# Patient Record
(demographics unavailable — no encounter records)

---

## 2025-04-11 NOTE — HISTORY OF PRESENT ILLNESS
[FreeTextEntry1] : 56yo female incidentally found to have right renal mass from CT in 2/2021, s/p RAL R partial nephrectomy July 2021 Path pT1a G2 clear cell RCC. Patient presents to review surveillance CT. States she has been feeling well. No complaints Denies hematuria. dysuria, flank pain. Does endorse occasional "tugging" sensation over right flank area with certain positional changes that are not painful or bothersome, but that she notices. CT 3/23 - no evidence of recurrence or mets CT 3/24 -  no evidence of recurrence or mets CT 3/2025 - no evidence of recurrence or mets, small 5mm hypodense lesion in R upper pole too small to characterize

## 2025-04-11 NOTE — ASSESSMENT
[FreeTextEntry1] : 59yo female incidentally found to have right renal mass from CT in 2/2021, s/p RAL R partial nephrectomy July 2021 Path pT1a G2 clear cell RCC CT no recurrence in 2023, 2024, and most recently 3/2025  Plan: - Obtain CXR this year per guideline recommendations - Follow up 1 year for surveillance CT and CXR - CT and CXR ordered

## 2025-04-11 NOTE — END OF VISIT
[] : Resident [FreeTextEntry3] : CXR yearly until year 5  Chest ct done with abd ct :neg recurrence CXR OK with f/u abd ct as LOW RISK partial  Chest CT for HIGH Risk or Very high risk as per guidelines

## 2025-04-21 NOTE — HISTORY OF PRESENT ILLNESS
[FreeTextEntry8] : Patient is a 59yo F with PMHx of RCC s/p partial nephrectomy in 2021, HTN, obesity, asthma, uterine fibromas, prediabetes, hx of GIB, presenting to the office for b/l hand numbness. Occurring over a few years, worse after the pandemic. Has been using her carpal tunnel braces. Worse in the morning. Has not taken any medications for this. Associated neck stiffness. No injury/trauma.   Patient also concerned with shortness of breath with exertion like taking garbage can to the curb or climbing stairs, has been worsening over the past couple of years. States her legs get swollen in the night. Denies PND, orthopnea, chest pain. No hx of asthma/COPD, no smoking but states she was given an inhaler years ago that she doesnt use. States she feels like she hears whistling inside her chest occasionally.  Also concerned with palpitations, had one incidence a month ago, took 1 minute to resolve. None since then.

## 2025-04-21 NOTE — PHYSICAL EXAM
[No Acute Distress] : no acute distress [Well Nourished] : well nourished [No Respiratory Distress] : no respiratory distress  [No Accessory Muscle Use] : no accessory muscle use [Clear to Auscultation] : lungs were clear to auscultation bilaterally [Normal Rate] : normal rate  [Regular Rhythm] : with a regular rhythm [Normal S1, S2] : normal S1 and S2 [No Murmur] : no murmur heard [Pedal Pulses Present] : the pedal pulses are present [Normal Affect] : the affect was normal [Normal Insight/Judgement] : insight and judgment were intact [de-identified] : +1 b/l LE nonpitting edema [de-identified] : spurling neg b/l

## 2025-04-21 NOTE — HISTORY OF PRESENT ILLNESS
[FreeTextEntry8] : Patient is a 61yo F with PMHx of RCC s/p partial nephrectomy in 2021, HTN, obesity, asthma, uterine fibromas, prediabetes, hx of GIB, presenting to the office for b/l hand numbness. Occurring over a few years, worse after the pandemic. Has been using her carpal tunnel braces. Worse in the morning. Has not taken any medications for this. Associated neck stiffness. No injury/trauma.   Patient also concerned with shortness of breath with exertion like taking garbage can to the curb or climbing stairs, has been worsening over the past couple of years. States her legs get swollen in the night. Denies PND, orthopnea, chest pain. No hx of asthma/COPD, no smoking but states she was given an inhaler years ago that she doesnt use. States she feels like she hears whistling inside her chest occasionally.  Also concerned with palpitations, had one incidence a month ago, took 1 minute to resolve. None since then.

## 2025-04-21 NOTE — REVIEW OF SYSTEMS
[Palpitations] : palpitations [Lower Ext Edema] : lower extremity edema [Dyspnea on Exertion] : dyspnea on exertion [Fever] : no fever [Chills] : no chills [Fatigue] : no fatigue [Hot Flashes] : no hot flashes [Chest Pain] : no chest pain [Leg Claudication] : no leg claudication [Orthopnea] : no orthopnea [Paroxysmal Nocturnal Dyspnea] : no paroxysmal nocturnal dyspnea [Shortness Of Breath] : no shortness of breath [Wheezing] : no wheezing [Cough] : no cough [Abdominal Pain] : no abdominal pain

## 2025-04-21 NOTE — PHYSICAL EXAM
[No Acute Distress] : no acute distress [Well Nourished] : well nourished [No Respiratory Distress] : no respiratory distress  [No Accessory Muscle Use] : no accessory muscle use [Clear to Auscultation] : lungs were clear to auscultation bilaterally [Normal Rate] : normal rate  [Regular Rhythm] : with a regular rhythm [Normal S1, S2] : normal S1 and S2 [No Murmur] : no murmur heard [Pedal Pulses Present] : the pedal pulses are present [Normal Affect] : the affect was normal [Normal Insight/Judgement] : insight and judgment were intact [de-identified] : +1 b/l LE nonpitting edema [de-identified] : spurling neg b/l

## 2025-05-11 NOTE — PHYSICAL EXAM
[No Acute Distress] : no acute distress [Well Nourished] : well nourished [Well Developed] : well developed [Well-Appearing] : well-appearing [No Respiratory Distress] : no respiratory distress  [No Accessory Muscle Use] : no accessory muscle use [Clear to Auscultation] : lungs were clear to auscultation bilaterally [Normal Rate] : normal rate  [Regular Rhythm] : with a regular rhythm [Normal S1, S2] : normal S1 and S2 [Soft] : abdomen soft [Non Tender] : non-tender [Non-distended] : non-distended [Normal Bowel Sounds] : normal bowel sounds [Normal Affect] : the affect was normal [Normal Insight/Judgement] : insight and judgment were intact [Normal Outer Ear/Nose] : the outer ears and nose were normal in appearance [Normal Oropharynx] : the oropharynx was normal [de-identified] : no sinus tenderness to palpation [de-identified] : dry cough

## 2025-05-11 NOTE — PHYSICAL EXAM
[No Acute Distress] : no acute distress [Well Nourished] : well nourished [Well Developed] : well developed [Well-Appearing] : well-appearing [No Respiratory Distress] : no respiratory distress  [No Accessory Muscle Use] : no accessory muscle use [Clear to Auscultation] : lungs were clear to auscultation bilaterally [Normal Rate] : normal rate  [Regular Rhythm] : with a regular rhythm [Normal S1, S2] : normal S1 and S2 [Soft] : abdomen soft [Non Tender] : non-tender [Non-distended] : non-distended [Normal Bowel Sounds] : normal bowel sounds [Normal Affect] : the affect was normal [Normal Insight/Judgement] : insight and judgment were intact [Normal Outer Ear/Nose] : the outer ears and nose were normal in appearance [Normal Oropharynx] : the oropharynx was normal [de-identified] : no sinus tenderness to palpation [de-identified] : dry cough

## 2025-05-11 NOTE — HISTORY OF PRESENT ILLNESS
[FreeTextEntry8] : Patient is a 61yo F with PMHx of RCC s/p partial nephrectomy in 2021, HTN, obesity, asthma, uterine fibromas, prediabetes, hx of GIB, presenting to the office for concern for sinus infection. Patient states that symptoms started about two weeks ago with fever, sneezing and headache. After about 5 days, she found 6 tablets of amoxicillin in her cabinet and took them. She also took a home Covid test which was negative. She presents today with concern that symptoms still remaining. Feels that symptoms have gotten better but still has cough, sore throat and sinus pressure. She also feels that she has congestion and heaviness in her throat. She has been taking Mucinex as well with minimal relief. She is travelling next week and concerned about feeling better.

## 2025-05-11 NOTE — HISTORY OF PRESENT ILLNESS
[FreeTextEntry8] : Patient is a 59yo F with PMHx of RCC s/p partial nephrectomy in 2021, HTN, obesity, asthma, uterine fibromas, prediabetes, hx of GIB, presenting to the office for concern for sinus infection. Patient states that symptoms started about two weeks ago with fever, sneezing and headache. After about 5 days, she found 6 tablets of amoxicillin in her cabinet and took them. She also took a home Covid test which was negative. She presents today with concern that symptoms still remaining. Feels that symptoms have gotten better but still has cough, sore throat and sinus pressure. She also feels that she has congestion and heaviness in her throat. She has been taking Mucinex as well with minimal relief. She is travelling next week and concerned about feeling better.

## 2025-05-30 NOTE — HISTORY OF PRESENT ILLNESS
[FreeTextEntry8] : Patient is a 59yo F presenting for CSP, due for mammo. [FreeTextEntry1] : SOB/hand numbness f/u  [de-identified] : Patient is a 59-year-old with PMHx of RCC s/p partial nephrectomy in 2021, HTN, obesity, asthma, uterine fibromas, prediabetes, presenting to the office for f/u on SOB and b/l hand numbness.   Patient last seen for this 4/18/25. Completed TTE, BNP WNL. B12/folate/vit D/TSH WNL. Cervical XR w/ degenerative changes, straightening of cervical lordosis.

## 2025-05-30 NOTE — PHYSICAL EXAM
[Examination Of The Breasts] : a normal appearance [Breast Implant Bilateral] : implants [Normal] : normal [No Discharge] : no discharge [No Masses] : no breast masses were palpable [No Acute Distress] : no acute distress [Well Nourished] : well nourished [No Respiratory Distress] : no respiratory distress  [No Accessory Muscle Use] : no accessory muscle use [Clear to Auscultation] : lungs were clear to auscultation bilaterally [Normal Rate] : normal rate  [Regular Rhythm] : with a regular rhythm [Normal S1, S2] : normal S1 and S2 [Normal Affect] : the affect was normal [Normal Insight/Judgement] : insight and judgment were intact [Breast Palpation Diffuse Fibrous Tissue Bilateral] : no fibrocystic changes [Breast Atrophy Bilateral] : no atrophy [Breast Hypertrophy Bilateral] : no hypertrophy [Breast - [place] Bilateral] : no [place] [Dimpling In Both Breasts] : no dimpling [Superficial Breast Veins Dilated Bilaterally] : no dilated superficial veins [Breast Nipple Inversion] : not inverted [Breast Nipple Retraction] : not retracted [Breast Nipple Flattening] : not flattened [Breast Nipple Fissures] : not fissured [Breast Abnormal Lactation (Galactorrhea)] : no galactorrhea [Breast Abnormal Secretion Bloody Fluid] : no bloody discharge [Breast Abnormal Secretion Serous Fluid] : no serous discharge [Breast Abnormal Secretion Opalescent Fluid] : no milky discharge [Enlargement Of The Right Breast] : no swelling [Tenderness Of The Right Breast] : no tenderness [Right Breast Absent] : no mastectomy [Breast Reconstruction Right] : no breast reconstruction [Enlargement Of The Left Breast] : no swelling [Tenderness Of The Left Breast] : no tenderness [Left Breast Absent] : no mastectomy [___] : no mastectomy scar [Breast Reconstruction Left] : no breast reconstruction [Axillary Lymph Nodes Enlarged Bilaterally] : no enlarged nodes [de-identified] : ani ambriz RN

## 2025-05-30 NOTE — HISTORY OF PRESENT ILLNESS
[FreeTextEntry8] : Patient is a 59yo F presenting for CSP, due for mammo. [FreeTextEntry1] : SOB/hand numbness f/u  [de-identified] : Patient is a 59-year-old with PMHx of RCC s/p partial nephrectomy in 2021, HTN, obesity, asthma, uterine fibromas, prediabetes, presenting to the office for f/u on SOB and b/l hand numbness.   Patient last seen for this 4/18/25. Completed TTE, BNP WNL. B12/folate/vit D/TSH WNL. Cervical XR w/ degenerative changes, straightening of cervical lordosis.

## 2025-05-30 NOTE — PHYSICAL EXAM
[Examination Of The Breasts] : a normal appearance [Breast Implant Bilateral] : implants [Normal] : normal [No Discharge] : no discharge [No Masses] : no breast masses were palpable [No Acute Distress] : no acute distress [Well Nourished] : well nourished [No Respiratory Distress] : no respiratory distress  [No Accessory Muscle Use] : no accessory muscle use [Clear to Auscultation] : lungs were clear to auscultation bilaterally [Normal Rate] : normal rate  [Regular Rhythm] : with a regular rhythm [Normal S1, S2] : normal S1 and S2 [Normal Affect] : the affect was normal [Normal Insight/Judgement] : insight and judgment were intact [Breast Palpation Diffuse Fibrous Tissue Bilateral] : no fibrocystic changes [Breast Atrophy Bilateral] : no atrophy [Breast Hypertrophy Bilateral] : no hypertrophy [Breast - [place] Bilateral] : no [place] [Dimpling In Both Breasts] : no dimpling [Superficial Breast Veins Dilated Bilaterally] : no dilated superficial veins [Breast Nipple Inversion] : not inverted [Breast Nipple Retraction] : not retracted [Breast Nipple Flattening] : not flattened [Breast Nipple Fissures] : not fissured [Breast Abnormal Lactation (Galactorrhea)] : no galactorrhea [Breast Abnormal Secretion Bloody Fluid] : no bloody discharge [Breast Abnormal Secretion Serous Fluid] : no serous discharge [Breast Abnormal Secretion Opalescent Fluid] : no milky discharge [Enlargement Of The Right Breast] : no swelling [Tenderness Of The Right Breast] : no tenderness [Right Breast Absent] : no mastectomy [Breast Reconstruction Right] : no breast reconstruction [Enlargement Of The Left Breast] : no swelling [Tenderness Of The Left Breast] : no tenderness [Left Breast Absent] : no mastectomy [___] : no mastectomy scar [Breast Reconstruction Left] : no breast reconstruction [Axillary Lymph Nodes Enlarged Bilaterally] : no enlarged nodes [de-identified] : ani ambriz RN

## 2025-05-30 NOTE — PHYSICAL EXAM
[Examination Of The Breasts] : a normal appearance [Breast Implant Bilateral] : implants [Normal] : normal [No Discharge] : no discharge [No Masses] : no breast masses were palpable [No Acute Distress] : no acute distress [Well Nourished] : well nourished [No Respiratory Distress] : no respiratory distress  [No Accessory Muscle Use] : no accessory muscle use [Clear to Auscultation] : lungs were clear to auscultation bilaterally [Normal Rate] : normal rate  [Regular Rhythm] : with a regular rhythm [Normal S1, S2] : normal S1 and S2 [Normal Affect] : the affect was normal [Normal Insight/Judgement] : insight and judgment were intact [Breast Palpation Diffuse Fibrous Tissue Bilateral] : no fibrocystic changes [Breast Atrophy Bilateral] : no atrophy [Breast Hypertrophy Bilateral] : no hypertrophy [Breast - [place] Bilateral] : no [place] [Dimpling In Both Breasts] : no dimpling [Superficial Breast Veins Dilated Bilaterally] : no dilated superficial veins [Breast Nipple Inversion] : not inverted [Breast Nipple Retraction] : not retracted [Breast Nipple Flattening] : not flattened [Breast Nipple Fissures] : not fissured [Breast Abnormal Lactation (Galactorrhea)] : no galactorrhea [Breast Abnormal Secretion Bloody Fluid] : no bloody discharge [Breast Abnormal Secretion Serous Fluid] : no serous discharge [Breast Abnormal Secretion Opalescent Fluid] : no milky discharge [Enlargement Of The Right Breast] : no swelling [Tenderness Of The Right Breast] : no tenderness [Right Breast Absent] : no mastectomy [Breast Reconstruction Right] : no breast reconstruction [Enlargement Of The Left Breast] : no swelling [Tenderness Of The Left Breast] : no tenderness [Left Breast Absent] : no mastectomy [___] : no mastectomy scar [Breast Reconstruction Left] : no breast reconstruction [Axillary Lymph Nodes Enlarged Bilaterally] : no enlarged nodes [de-identified] : ani ambriz RN

## 2025-05-30 NOTE — PHYSICAL EXAM
[Examination Of The Breasts] : a normal appearance [Breast Implant Bilateral] : implants [Normal] : normal [No Discharge] : no discharge [No Masses] : no breast masses were palpable [No Acute Distress] : no acute distress [Well Nourished] : well nourished [No Respiratory Distress] : no respiratory distress  [No Accessory Muscle Use] : no accessory muscle use [Clear to Auscultation] : lungs were clear to auscultation bilaterally [Normal Rate] : normal rate  [Regular Rhythm] : with a regular rhythm [Normal S1, S2] : normal S1 and S2 [Normal Affect] : the affect was normal [Normal Insight/Judgement] : insight and judgment were intact [Breast Palpation Diffuse Fibrous Tissue Bilateral] : no fibrocystic changes [Breast Atrophy Bilateral] : no atrophy [Breast Hypertrophy Bilateral] : no hypertrophy [Breast - [place] Bilateral] : no [place] [Dimpling In Both Breasts] : no dimpling [Superficial Breast Veins Dilated Bilaterally] : no dilated superficial veins [Breast Nipple Inversion] : not inverted [Breast Nipple Retraction] : not retracted [Breast Nipple Flattening] : not flattened [Breast Nipple Fissures] : not fissured [Breast Abnormal Lactation (Galactorrhea)] : no galactorrhea [Breast Abnormal Secretion Bloody Fluid] : no bloody discharge [Breast Abnormal Secretion Serous Fluid] : no serous discharge [Breast Abnormal Secretion Opalescent Fluid] : no milky discharge [Enlargement Of The Right Breast] : no swelling [Tenderness Of The Right Breast] : no tenderness [Right Breast Absent] : no mastectomy [Breast Reconstruction Right] : no breast reconstruction [Enlargement Of The Left Breast] : no swelling [Tenderness Of The Left Breast] : no tenderness [Left Breast Absent] : no mastectomy [___] : no mastectomy scar [Breast Reconstruction Left] : no breast reconstruction [Axillary Lymph Nodes Enlarged Bilaterally] : no enlarged nodes [de-identified] : ani ambriz RN

## 2025-05-30 NOTE — HISTORY OF PRESENT ILLNESS
[FreeTextEntry8] : Patient is a 61yo F presenting for CSP, due for mammo. [FreeTextEntry1] : SOB/hand numbness f/u  [de-identified] : Patient is a 59-year-old with PMHx of RCC s/p partial nephrectomy in 2021, HTN, obesity, asthma, uterine fibromas, prediabetes, presenting to the office for f/u on SOB and b/l hand numbness.   Patient last seen for this 4/18/25. Completed TTE, BNP WNL. B12/folate/vit D/TSH WNL. Cervical XR w/ degenerative changes, straightening of cervical lordosis.

## 2025-05-30 NOTE — HISTORY OF PRESENT ILLNESS
[FreeTextEntry8] : Patient is a 61yo F presenting for CSP, due for mammo. [FreeTextEntry1] : SOB/hand numbness f/u  [de-identified] : Patient is a 59-year-old with PMHx of RCC s/p partial nephrectomy in 2021, HTN, obesity, asthma, uterine fibromas, prediabetes, presenting to the office for f/u on SOB and b/l hand numbness.   Patient last seen for this 4/18/25. Completed TTE, BNP WNL. B12/folate/vit D/TSH WNL. Cervical XR w/ degenerative changes, straightening of cervical lordosis.

## 2025-06-04 NOTE — ASSESSMENT
[FreeTextEntry1] : 60 year old female with social smoking hx (1 cigarette every 2 months or so) who presents for evaluation of shortness of breath. She has classic symptoms of reactive airway disease and has a known dx of KYRIE.   # Reactive airway disease # Rhinitis # Upper airway cough syndrome - Pt with reactive airway disease given wheezing and dyspnea when exposed to environmental triggers and with exercise.  - PFTS - Feno  - CBC w/ diff, IGE - Allergy RAST - Given Breztri 1 puff BID x2 month supply  - On flonase already - Add azelastine - TTE with only grade 1 diastolic dysfunction.   # Mild KYRIE - AHI 9 - DIscussed with pt - does not want to pursue treatment at this time.   # Smoking cessation  - Counseled on smoking cessation.